# Patient Record
Sex: MALE | Race: WHITE | NOT HISPANIC OR LATINO
[De-identification: names, ages, dates, MRNs, and addresses within clinical notes are randomized per-mention and may not be internally consistent; named-entity substitution may affect disease eponyms.]

---

## 2019-01-15 ENCOUNTER — RESULT REVIEW (OUTPATIENT)
Age: 64
End: 2019-01-15

## 2019-01-15 ENCOUNTER — TRANSCRIPTION ENCOUNTER (OUTPATIENT)
Age: 64
End: 2019-01-15

## 2019-01-15 ENCOUNTER — INPATIENT (INPATIENT)
Facility: HOSPITAL | Age: 64
LOS: 0 days | Discharge: HOME | DRG: 343 | End: 2019-01-16
Attending: SURGERY | Admitting: SURGERY
Payer: COMMERCIAL

## 2019-01-15 VITALS
DIASTOLIC BLOOD PRESSURE: 69 MMHG | HEART RATE: 60 BPM | HEIGHT: 71 IN | OXYGEN SATURATION: 99 % | RESPIRATION RATE: 18 BRPM | WEIGHT: 214.95 LBS | SYSTOLIC BLOOD PRESSURE: 184 MMHG | TEMPERATURE: 98 F

## 2019-01-15 LAB
ALBUMIN SERPL ELPH-MCNC: 4.9 G/DL — SIGNIFICANT CHANGE UP (ref 3.3–5)
ALP SERPL-CCNC: 88 U/L — SIGNIFICANT CHANGE UP (ref 40–120)
ALT FLD-CCNC: 28 U/L — SIGNIFICANT CHANGE UP (ref 10–45)
ANION GAP SERPL CALC-SCNC: 15 MMOL/L — SIGNIFICANT CHANGE UP (ref 5–17)
APPEARANCE UR: CLEAR — SIGNIFICANT CHANGE UP
APTT BLD: 31.8 SEC — SIGNIFICANT CHANGE UP (ref 27.5–36.3)
AST SERPL-CCNC: 21 U/L — SIGNIFICANT CHANGE UP (ref 10–40)
BACTERIA # UR AUTO: NEGATIVE — SIGNIFICANT CHANGE UP
BASOPHILS # BLD AUTO: 0 K/UL — SIGNIFICANT CHANGE UP (ref 0–0.2)
BASOPHILS NFR BLD AUTO: 0.1 % — SIGNIFICANT CHANGE UP (ref 0–2)
BILIRUB SERPL-MCNC: 0.5 MG/DL — SIGNIFICANT CHANGE UP (ref 0.2–1.2)
BILIRUB UR-MCNC: NEGATIVE — SIGNIFICANT CHANGE UP
BUN SERPL-MCNC: 9 MG/DL — SIGNIFICANT CHANGE UP (ref 7–23)
CALCIUM SERPL-MCNC: 9.6 MG/DL — SIGNIFICANT CHANGE UP (ref 8.4–10.5)
CHLORIDE SERPL-SCNC: 96 MMOL/L — SIGNIFICANT CHANGE UP (ref 96–108)
CO2 SERPL-SCNC: 27 MMOL/L — SIGNIFICANT CHANGE UP (ref 22–31)
COLOR SPEC: SIGNIFICANT CHANGE UP
CREAT SERPL-MCNC: 0.95 MG/DL — SIGNIFICANT CHANGE UP (ref 0.5–1.3)
DIFF PNL FLD: NEGATIVE — SIGNIFICANT CHANGE UP
EOSINOPHIL # BLD AUTO: 0.1 K/UL — SIGNIFICANT CHANGE UP (ref 0–0.5)
EOSINOPHIL NFR BLD AUTO: 0.3 % — SIGNIFICANT CHANGE UP (ref 0–6)
EPI CELLS # UR: 0 /HPF — SIGNIFICANT CHANGE UP
GAS PNL BLDV: SIGNIFICANT CHANGE UP
GLUCOSE SERPL-MCNC: 119 MG/DL — HIGH (ref 70–99)
GLUCOSE UR QL: NEGATIVE — SIGNIFICANT CHANGE UP
HCT VFR BLD CALC: 47.4 % — SIGNIFICANT CHANGE UP (ref 39–50)
HGB BLD-MCNC: 16.6 G/DL — SIGNIFICANT CHANGE UP (ref 13–17)
HYALINE CASTS # UR AUTO: 1 /LPF — SIGNIFICANT CHANGE UP (ref 0–2)
INR BLD: 1.04 RATIO — SIGNIFICANT CHANGE UP (ref 0.88–1.16)
KETONES UR-MCNC: NEGATIVE — SIGNIFICANT CHANGE UP
LEUKOCYTE ESTERASE UR-ACNC: NEGATIVE — SIGNIFICANT CHANGE UP
LYMPHOCYTES # BLD AUTO: 1 K/UL — SIGNIFICANT CHANGE UP (ref 1–3.3)
LYMPHOCYTES # BLD AUTO: 5 % — LOW (ref 13–44)
MCHC RBC-ENTMCNC: 32.4 PG — SIGNIFICANT CHANGE UP (ref 27–34)
MCHC RBC-ENTMCNC: 35 GM/DL — SIGNIFICANT CHANGE UP (ref 32–36)
MCV RBC AUTO: 92.6 FL — SIGNIFICANT CHANGE UP (ref 80–100)
MONOCYTES # BLD AUTO: 1 K/UL — HIGH (ref 0–0.9)
MONOCYTES NFR BLD AUTO: 4.9 % — SIGNIFICANT CHANGE UP (ref 2–14)
NEUTROPHILS # BLD AUTO: 17.9 K/UL — HIGH (ref 1.8–7.4)
NEUTROPHILS NFR BLD AUTO: 89.8 % — HIGH (ref 43–77)
NITRITE UR-MCNC: NEGATIVE — SIGNIFICANT CHANGE UP
PH UR: 7.5 — SIGNIFICANT CHANGE UP (ref 5–8)
PLATELET # BLD AUTO: 287 K/UL — SIGNIFICANT CHANGE UP (ref 150–400)
POTASSIUM SERPL-MCNC: 3.7 MMOL/L — SIGNIFICANT CHANGE UP (ref 3.5–5.3)
POTASSIUM SERPL-SCNC: 3.7 MMOL/L — SIGNIFICANT CHANGE UP (ref 3.5–5.3)
PROT SERPL-MCNC: 8.2 G/DL — SIGNIFICANT CHANGE UP (ref 6–8.3)
PROT UR-MCNC: ABNORMAL
PROTHROM AB SERPL-ACNC: 11.9 SEC — SIGNIFICANT CHANGE UP (ref 10–12.9)
RBC # BLD: 5.12 M/UL — SIGNIFICANT CHANGE UP (ref 4.2–5.8)
RBC # FLD: 12.7 % — SIGNIFICANT CHANGE UP (ref 10.3–14.5)
RBC CASTS # UR COMP ASSIST: 3 /HPF — SIGNIFICANT CHANGE UP (ref 0–4)
SODIUM SERPL-SCNC: 138 MMOL/L — SIGNIFICANT CHANGE UP (ref 135–145)
SP GR SPEC: 1.02 — SIGNIFICANT CHANGE UP (ref 1.01–1.02)
UROBILINOGEN FLD QL: NEGATIVE — SIGNIFICANT CHANGE UP
WBC # BLD: 19.9 K/UL — HIGH (ref 3.8–10.5)
WBC # FLD AUTO: 19.9 K/UL — HIGH (ref 3.8–10.5)
WBC UR QL: 0 /HPF — SIGNIFICANT CHANGE UP (ref 0–5)

## 2019-01-15 PROCEDURE — 99285 EMERGENCY DEPT VISIT HI MDM: CPT

## 2019-01-15 PROCEDURE — 74177 CT ABD & PELVIS W/CONTRAST: CPT | Mod: 26

## 2019-01-15 RX ORDER — ONDANSETRON 8 MG/1
4 TABLET, FILM COATED ORAL ONCE
Qty: 0 | Refills: 0 | Status: COMPLETED | OUTPATIENT
Start: 2019-01-15 | End: 2019-01-15

## 2019-01-15 RX ORDER — MORPHINE SULFATE 50 MG/1
4 CAPSULE, EXTENDED RELEASE ORAL ONCE
Qty: 0 | Refills: 0 | Status: DISCONTINUED | OUTPATIENT
Start: 2019-01-15 | End: 2019-01-15

## 2019-01-15 RX ORDER — SODIUM CHLORIDE 9 MG/ML
1000 INJECTION INTRAMUSCULAR; INTRAVENOUS; SUBCUTANEOUS ONCE
Qty: 0 | Refills: 0 | Status: COMPLETED | OUTPATIENT
Start: 2019-01-15 | End: 2019-01-15

## 2019-01-15 RX ORDER — SODIUM CHLORIDE 9 MG/ML
1000 INJECTION, SOLUTION INTRAVENOUS ONCE
Qty: 0 | Refills: 0 | Status: COMPLETED | OUTPATIENT
Start: 2019-01-15 | End: 2019-01-15

## 2019-01-15 RX ORDER — PIPERACILLIN AND TAZOBACTAM 4; .5 G/20ML; G/20ML
3.38 INJECTION, POWDER, LYOPHILIZED, FOR SOLUTION INTRAVENOUS ONCE
Qty: 0 | Refills: 0 | Status: COMPLETED | OUTPATIENT
Start: 2019-01-15 | End: 2019-01-16

## 2019-01-15 RX ADMIN — SODIUM CHLORIDE 2000 MILLILITER(S): 9 INJECTION INTRAMUSCULAR; INTRAVENOUS; SUBCUTANEOUS at 21:59

## 2019-01-15 RX ADMIN — MORPHINE SULFATE 4 MILLIGRAM(S): 50 CAPSULE, EXTENDED RELEASE ORAL at 22:15

## 2019-01-15 RX ADMIN — MORPHINE SULFATE 4 MILLIGRAM(S): 50 CAPSULE, EXTENDED RELEASE ORAL at 21:59

## 2019-01-15 RX ADMIN — ONDANSETRON 4 MILLIGRAM(S): 8 TABLET, FILM COATED ORAL at 21:59

## 2019-01-15 NOTE — ED PROVIDER NOTE - OBJECTIVE STATEMENT
63M with past medical history bph presents with 1d h/o generalized abdominal pain now localized to RLQ with no radiation, a/w nbnb n/v.  Last bowel movement was this AM and was normal.  Denies fever, diarrhea, dsyuria, testicle pain.

## 2019-01-15 NOTE — ED PROVIDER NOTE - PHYSICAL EXAMINATION
***GEN - well appearing; NAD   ***HEAD - NC/AT  ***EYES/NOSE - PEERL, EOMI, mucous membranes moist, no discharge   ***THROAT: Oral cavity and pharynx normal. No inflammation, swelling, exudate, or lesions.    ***NECK: supple, non-tender no lymphadenopathy  ***PULMONARY - CTA b/l, symmetric breath sounds.   ***CARDIAC- s1s2, RRR, no murmur  ***ABDOMEN - +BS, ND, right lower quadrant ttp, soft, no guarding, no rebound, no organomegaly  ***BACK - no CVA tenderness, Normal  spine, no spinal TTP  ***EXTREMITIES - symmetric pulses, 2+ dp, capillary refill < 2 seconds, no clubbing, no cyanosis, no edema   ***SKIN - warm, dry, intact, no rash or bruising   ***NEUROLOGIC - a&o x3, CN 3-12 intact, sensation nl, motor 5/5 RUE/LUE/RLE/LLE gait nl, ***GEN - well appearing; NAD   ***HEAD - NC/AT  ***EYES/NOSE - PEERL, EOMI, mucous membranes moist, no discharge   ***THROAT: Oral cavity and pharynx normal. No inflammation, swelling, exudate, or lesions.    ***NECK: supple, non-tender no lymphadenopathy  ***PULMONARY - CTA b/l, symmetric breath sounds.   ***CARDIAC- s1s2, RRR, no murmur  ***ABDOMEN - +BS, ND, right lower quadrant ttp, soft, no guarding, no rebound, no organomegaly  ***BACK - no CVA tenderness, Normal  spine, no spinal TTP  ***EXTREMITIES - symmetric pulses, 2+ dp, capillary refill < 2 seconds, no clubbing, no cyanosis, no edema   ***SKIN - warm, dry, intact, no rash or bruising   ***NEUROLOGIC - a&o x3, CN 3-12 intact, sensation nl, motor 5/5 RUE/LUE/RLE/LLE gait nl,        Dr Wenceslao davis --

## 2019-01-15 NOTE — ED PROVIDER NOTE - MEDICAL DECISION MAKING DETAILS
63M with RLQ pain, initially diffuse now localized.  COncerning for appendicitis vs. divertic vs. hernia.  Labs, ct, pain meds.  hd stable.

## 2019-01-15 NOTE — ED ADULT NURSE NOTE - NSIMPLEMENTINTERV_GEN_ALL_ED
Implemented All Universal Safety Interventions:  [place] to call system. Call bell, personal items and telephone within reach. Instruct patient to call for assistance. Room bathroom lighting operational. Non-slip footwear when patient is off stretcher. Physically safe environment: no spills, clutter or unnecessary equipment. Stretcher in lowest position, wheels locked, appropriate side rails in place.

## 2019-01-15 NOTE — ED ADULT NURSE NOTE - OBJECTIVE STATEMENT
63 YOM A&Ox3 came in for stabbing right lower quadrant abdominal pain that began earlier today rated 6/10 with nausea. Patient had RLQ tenderness with no radiationand dull pain in mid abdominal area. 63 YOM A&Ox3 came in for stabbing right lower quadrant abdominal pain that began earlier today rated 6/10 with nausea. Patient had RLQ tenderness with no radiation and dull pain in mid abdominal area. Patient stated vomited 1 time today. Lung sounds clear & unlabored, heart sound within normal limits, bowel sounds heard in all 4 quadrants. Patient denies issues with bowel & bladder such as hematuria, pain and burning. Patient denies SOB, fever/chills, chest pain, headaches, blurry vision and diarrhea at this time. Patient denies pmh & taking daily medications. Safety & comfort maintained.

## 2019-01-15 NOTE — ED ADULT NURSE NOTE - CHPI ED NUR SYMPTOMS NEG
no blood in stool/no chills/no fever/no hematuria/no burning urination/no abdominal distension/no vomiting/no diarrhea

## 2019-01-16 ENCOUNTER — TRANSCRIPTION ENCOUNTER (OUTPATIENT)
Age: 64
End: 2019-01-16

## 2019-01-16 VITALS
TEMPERATURE: 98 F | HEART RATE: 63 BPM | SYSTOLIC BLOOD PRESSURE: 144 MMHG | DIASTOLIC BLOOD PRESSURE: 81 MMHG | OXYGEN SATURATION: 97 % | RESPIRATION RATE: 16 BRPM

## 2019-01-16 DIAGNOSIS — K35.80 UNSPECIFIED ACUTE APPENDICITIS: ICD-10-CM

## 2019-01-16 DIAGNOSIS — Z90.79 ACQUIRED ABSENCE OF OTHER GENITAL ORGAN(S): Chronic | ICD-10-CM

## 2019-01-16 LAB
BLD GP AB SCN SERPL QL: NEGATIVE — SIGNIFICANT CHANGE UP
GAS PNL BLDV: SIGNIFICANT CHANGE UP
RH IG SCN BLD-IMP: NEGATIVE — SIGNIFICANT CHANGE UP

## 2019-01-16 PROCEDURE — 85610 PROTHROMBIN TIME: CPT

## 2019-01-16 PROCEDURE — 93005 ELECTROCARDIOGRAM TRACING: CPT

## 2019-01-16 PROCEDURE — 82947 ASSAY GLUCOSE BLOOD QUANT: CPT

## 2019-01-16 PROCEDURE — 86850 RBC ANTIBODY SCREEN: CPT

## 2019-01-16 PROCEDURE — 85014 HEMATOCRIT: CPT

## 2019-01-16 PROCEDURE — 85027 COMPLETE CBC AUTOMATED: CPT

## 2019-01-16 PROCEDURE — 96375 TX/PRO/DX INJ NEW DRUG ADDON: CPT

## 2019-01-16 PROCEDURE — 80053 COMPREHEN METABOLIC PANEL: CPT

## 2019-01-16 PROCEDURE — 86901 BLOOD TYPING SEROLOGIC RH(D): CPT

## 2019-01-16 PROCEDURE — 83605 ASSAY OF LACTIC ACID: CPT

## 2019-01-16 PROCEDURE — 82803 BLOOD GASES ANY COMBINATION: CPT

## 2019-01-16 PROCEDURE — 44970 LAPAROSCOPY APPENDECTOMY: CPT

## 2019-01-16 PROCEDURE — 84132 ASSAY OF SERUM POTASSIUM: CPT

## 2019-01-16 PROCEDURE — 82330 ASSAY OF CALCIUM: CPT

## 2019-01-16 PROCEDURE — 74177 CT ABD & PELVIS W/CONTRAST: CPT

## 2019-01-16 PROCEDURE — 99221 1ST HOSP IP/OBS SF/LOW 40: CPT | Mod: 57

## 2019-01-16 PROCEDURE — 86900 BLOOD TYPING SEROLOGIC ABO: CPT

## 2019-01-16 PROCEDURE — 85730 THROMBOPLASTIN TIME PARTIAL: CPT

## 2019-01-16 PROCEDURE — 82435 ASSAY OF BLOOD CHLORIDE: CPT

## 2019-01-16 PROCEDURE — 84295 ASSAY OF SERUM SODIUM: CPT

## 2019-01-16 PROCEDURE — 81001 URINALYSIS AUTO W/SCOPE: CPT

## 2019-01-16 PROCEDURE — 96374 THER/PROPH/DIAG INJ IV PUSH: CPT

## 2019-01-16 PROCEDURE — 99285 EMERGENCY DEPT VISIT HI MDM: CPT | Mod: 25

## 2019-01-16 RX ORDER — ONDANSETRON 8 MG/1
4 TABLET, FILM COATED ORAL ONCE
Qty: 0 | Refills: 0 | Status: COMPLETED | OUTPATIENT
Start: 2019-01-16 | End: 2019-01-16

## 2019-01-16 RX ORDER — FENTANYL CITRATE 50 UG/ML
50 INJECTION INTRAVENOUS ONCE
Qty: 0 | Refills: 0 | Status: DISCONTINUED | OUTPATIENT
Start: 2019-01-16 | End: 2019-01-16

## 2019-01-16 RX ORDER — OXYCODONE HYDROCHLORIDE 5 MG/1
5 TABLET ORAL EVERY 4 HOURS
Qty: 0 | Refills: 0 | Status: DISCONTINUED | OUTPATIENT
Start: 2019-01-16 | End: 2019-01-16

## 2019-01-16 RX ORDER — OXYCODONE HYDROCHLORIDE 5 MG/1
1 TABLET ORAL
Qty: 12 | Refills: 0 | OUTPATIENT
Start: 2019-01-16 | End: 2019-01-18

## 2019-01-16 RX ORDER — ENOXAPARIN SODIUM 100 MG/ML
40 INJECTION SUBCUTANEOUS DAILY
Qty: 0 | Refills: 0 | Status: DISCONTINUED | OUTPATIENT
Start: 2019-01-16 | End: 2019-01-16

## 2019-01-16 RX ORDER — FINASTERIDE 5 MG/1
5 TABLET, FILM COATED ORAL DAILY
Qty: 0 | Refills: 0 | Status: DISCONTINUED | OUTPATIENT
Start: 2019-01-16 | End: 2019-01-16

## 2019-01-16 RX ORDER — OXYCODONE HYDROCHLORIDE 5 MG/1
10 TABLET ORAL EVERY 4 HOURS
Qty: 0 | Refills: 0 | Status: DISCONTINUED | OUTPATIENT
Start: 2019-01-16 | End: 2019-01-16

## 2019-01-16 RX ORDER — MORPHINE SULFATE 50 MG/1
4 CAPSULE, EXTENDED RELEASE ORAL ONCE
Qty: 0 | Refills: 0 | Status: DISCONTINUED | OUTPATIENT
Start: 2019-01-16 | End: 2019-01-16

## 2019-01-16 RX ORDER — ACETAMINOPHEN 500 MG
650 TABLET ORAL EVERY 6 HOURS
Qty: 0 | Refills: 0 | Status: DISCONTINUED | OUTPATIENT
Start: 2019-01-16 | End: 2019-01-16

## 2019-01-16 RX ORDER — SODIUM CHLORIDE 9 MG/ML
1000 INJECTION, SOLUTION INTRAVENOUS
Qty: 0 | Refills: 0 | Status: DISCONTINUED | OUTPATIENT
Start: 2019-01-16 | End: 2019-01-16

## 2019-01-16 RX ORDER — FENTANYL CITRATE 50 UG/ML
25 INJECTION INTRAVENOUS
Qty: 0 | Refills: 0 | Status: DISCONTINUED | OUTPATIENT
Start: 2019-01-16 | End: 2019-01-16

## 2019-01-16 RX ORDER — ONDANSETRON 8 MG/1
4 TABLET, FILM COATED ORAL ONCE
Qty: 0 | Refills: 0 | Status: DISCONTINUED | OUTPATIENT
Start: 2019-01-16 | End: 2019-01-16

## 2019-01-16 RX ADMIN — SODIUM CHLORIDE 2000 MILLILITER(S): 9 INJECTION, SOLUTION INTRAVENOUS at 00:15

## 2019-01-16 RX ADMIN — PIPERACILLIN AND TAZOBACTAM 200 GRAM(S): 4; .5 INJECTION, POWDER, LYOPHILIZED, FOR SOLUTION INTRAVENOUS at 00:12

## 2019-01-16 RX ADMIN — ONDANSETRON 4 MILLIGRAM(S): 8 TABLET, FILM COATED ORAL at 01:05

## 2019-01-16 RX ADMIN — SODIUM CHLORIDE 100 MILLILITER(S): 9 INJECTION, SOLUTION INTRAVENOUS at 04:00

## 2019-01-16 RX ADMIN — MORPHINE SULFATE 4 MILLIGRAM(S): 50 CAPSULE, EXTENDED RELEASE ORAL at 01:05

## 2019-01-16 NOTE — H&P ADULT - ASSESSMENT
This is a 64 y/o male with a medical history as above with acute appendicitis     -Admit to Dr. Draper  -NPO  -IV ABX   -IVF  -Continue home meds  -For OR today for laparoscopic appendectomy, possible open  -D/w Dr. Draper

## 2019-01-16 NOTE — CHART NOTE - NSCHARTNOTEFT_GEN_A_CORE
Post-operative Check    SUBJECTIVE: No acute events in the immediate post-operative period. Pain well controlled. Tolerating diet, no n/v/f/c. Has voided. Reports he is doing well.     OBJECTIVE:  T(C): 36.7 (01-16-19 @ 07:40), Max: 37.2 (01-15-19 @ 21:19)  HR: 82 (01-16-19 @ 08:00) (60 - 82)  BP: 129/75 (01-16-19 @ 08:00) (120/66 - 184/69)  RR: 16 (01-16-19 @ 08:00) (12 - 18)  SpO2: 95% (01-16-19 @ 08:00) (94% - 99%)      01-15-19 @ 07:01  -  01-16-19 @ 07:00  --------------------------------------------------------  IN: 500 mL / OUT: 1325 mL / NET: -825 mL    01-16-19 @ 07:01  -  01-16-19 @ 08:26  --------------------------------------------------------  IN: 200 mL / OUT: 0 mL / NET: 200 mL        Physical Exam:     NAD, awake and alert  Respirations nonlabored  Abdomen soft, nontender, nondistended, incision sites c/d/i  No guarding or rebound tenderness      ASSESSMENT:   ASAF DAMON is a 63y Male POD#0 from lap appy. Stable, ready to go home.     PLAN:  - Pain management  - d/c

## 2019-01-16 NOTE — DISCHARGE NOTE ADULT - ADDITIONAL INSTRUCTIONS
WOUND CARE: Steri-strips have been applied to your incisions.  Do not remove these.  They will fall off as they get wet; in approximately 7-10 days.  BATHING: Please do not submerge wound underwater. You may shower and/or sponge bathe.  ACTIVITY: No heavy lifting or straining. Otherwise, you may return to your usual level of physical activity. If you are taking narcotic pain medication (such as Percocet), do NOT drive a car, operate machinery or make important decisions.  DIET: Return to your usual diet.  NOTIFY YOUR SURGEON IF: You have any bleeding that does not stop, any pus draining from your wound, any fever (over 100.4 F) or chills, persistent nausea/vomiting, persistent diarrhea, or if your pain is not controlled on your discharge pain medications.  FOLLOW-UP:  1. Please call to make a follow-up appointment within one week of discharge with Dr. Draper.   2. Please follow up with your primary care physician in one week regarding your hospitalization.  3. There was several incidental findings noted on your CT scan that you should discuss with your primary physician. They should be monitored and followed up appropriately. The following include: LIVER: Left hepatic cyst. VESSELS:  Mild atherosclerotic calcifications of the aorta. ABDOMINAL WALL: Multiple fat-containing ventral hernias. It is important to discuss these with your primary physician and follow accordingly.           Please notify your Attending Physician if after discharge you have fever, chills, abdominal pain, purulent drainage from your incision, in ability to pass flatus, not tolerating PO diet, abdominal bloating/distention, [place] and or vomiting. Additionally any acute changes as well. Please report back to the Emergency Department if unable to reach Physician.

## 2019-01-16 NOTE — DISCHARGE NOTE ADULT - HOSPITAL COURSE
This is a 62 y/o male with a medical history of BPH s/p Laparoscopic Prostate resection who presents to the ED today with c/o RLQ abdominal pain that initially started as periumbilical pain.  Pt states that he felt generalized abdominal discomfort that began at approx. 10am for which he attributed to mild discomfort.  Pain began to increase in severity throughout the day and was associated with nausea and vomiting during lunch.  He states that the pain eventually localized to the RLQ with pain that was persistent in nature. Pt then presented to the ED for further work-up.  In the ED he had labs drawn and a CT abd and pelvis which revealed a leukocytosis and acute appendicitis, arespectively.  General surgery consulted for further management.  Pt denies fevers, chills, recent sick contacts.  (Above as per admitting resident.)     CT scan: IMPRESSION: Dilated appendix with appendicoliths. No significant inflammatory change. Findings could be due to early appendicitis. Please correlate clinically. Brief OP note: inflamed gangrenous appendix, not perforated. No free fluid. Non complicated procedure patient was taken to the PACU and monitored appropriately before discharge.     During the hospital course, patient had lab work performed on a daily basis. Prior to discharge lab work was noted to be within normal. A normal WBC was noted to be down trending, and a HCT was noted within normal limits. Vitals were checked on a four hour basis, and patient was noted to be normotensive and afebrile upon discharge. Patient had a normal heart rate and adequate oxygen saturations. SOB/BARAJAS was denied. Pain medication was given prior to discharge with no allergic reaction, and reported adequate pain control.    At the time of discharge, the patient was hemodynamically stable, was tolerating PO diet, was voiding urine and passing stool, was ambulating, and was comfortable with adequate pain control. The patient was instructed to follow up with Dr. Draper within 1 week after discharge from the hospital. The patient felt comfortable with discharge. The patient was discharged to home. The patient had no other issues.

## 2019-01-16 NOTE — DISCHARGE NOTE ADULT - CARE PROVIDER_API CALL
Mehul Draper), Surgery  [address] [place] [address]  Phone: [phone]  Fax: [phone]

## 2019-01-16 NOTE — BRIEF OPERATIVE NOTE - PROCEDURE
<<-----Click on this checkbox to enter Procedure Laparoscopic appendectomy  01/16/2019    Active  [id number]

## 2019-01-16 NOTE — DISCHARGE NOTE ADULT - PATIENT PORTAL LINK FT
You can access the [facility] Portal, offered by [facility], by registering with the following website: [email or web address]

## 2019-01-16 NOTE — DISCHARGE NOTE ADULT - CARE PLAN
Principal Discharge DX:	Acute appendicitis, unspecified acute appendicitis type  Goal:	Antibiotics, s/p Laparoscopic appendectomy  Assessment and plan of treatment:	-Pain control   -OOB as tolerated   -Follow up within one week with Dr. Draper, and Primary care physician.

## 2019-01-16 NOTE — H&P ADULT - NSHPSOCIALHISTORY_GEN_ALL_CORE
ETOH: Denies  TOB: Denies  Illicits: Denies  Work: Works as , lives with family; Good social support network

## 2019-01-16 NOTE — H&P ADULT - ATTENDING COMMENTS
Pt seen and examined. Agree with A/P. Taken to OR for lap appy, tolerated procedure well. Will advance diet, pain control, home in am.

## 2019-01-16 NOTE — H&P ADULT - NSHPLABSRESULTS_GEN_ALL_CORE
01-15    138  |  96  |  9   ----------------------------<  119<H>  3.7   |  27  |  0.95    Ca    9.6      15 Tadeo 2019 22:17    TPro  8.2  /  Alb  4.9  /  TBili  0.5  /  DBili  x   /  AST  21  /  ALT  28  /  AlkPhos  88  01-15                          16.6   19.9  )-----------( 287      ( 15 Tadeo 2019 22:17 )             47.4       CT Abdomen and Pelvis w/ IV Cont (01.15.19 @ 23:20) >    FINDINGS:    LOWER CHEST: Bibasilar subsegmental atelectasis.    LIVER: Left hepatic cyst.  BILE DUCTS: Normal caliber.  GALLBLADDER: Within normal limits.  SPLEEN: Within normal limits.  PANCREAS: Within normal limits.  ADRENALS: Within normal limits.  KIDNEYS/URETERS: Within normal limits.    BLADDER: Within normal limits.  REPRODUCTIVE ORGANS: The prostate is within normal limits.    BOWEL: No bowel obstruction. The base of the appendix is dilated up to   1.5 cm and contains a couple of appendicolith, one at its orifice. There   is no striking periappendiceal inflammatory change. No adjacent fluid   collection or free air.  PERITONEUM: No ascites.  VESSELS:  Mild atherosclerotic calcifications of the aorta.  RETROPERITONEUM: No lymphadenopathy.    ABDOMINAL WALL: Multiple fat-containing ventral hernias.  BONES: Mild degenerative changes of the spine.    IMPRESSION:     Dilated appendix with appendicoliths. No significant inflammatory change.   Findings could be due to early appendicitis. Please correlate clinically.

## 2019-01-16 NOTE — BRIEF OPERATIVE NOTE - OPERATION/FINDINGS
inflamed gangrenous appendix, not perforated. No free fluid.     mesoappendix taken with ligasure, base taken with blue 30 staple load. hemostatic

## 2019-01-16 NOTE — DISCHARGE NOTE ADULT - PLAN OF CARE
Antibiotics, s/p Laparoscopic appendectomy -Pain control   -OOB as tolerated   -Follow up within one week with Dr. Draper, and Primary care physician.

## 2019-01-16 NOTE — H&P ADULT - HISTORY OF PRESENT ILLNESS
This is a 62 y/o male with a medical history of BPH s/p Laparoscopic Prostate resection who presents to the ED today with c/o RLQ abdominal pain that initially started as periumbilical pain.  Pt states that he felt generalizied abdominal discomfort that began at approx 10am for which he attributed to mild discomfort.  Pain began to increase in severity throughout the day and was associated with nausea and vomiting during lunch.  He states that the pain eventually localized to the RLQ with pain that was persistent in nature. Pt then presented to the ED for further work-up.  In the ED he had labs drawna nd a CT abd and pelvis which revealed a leukocytosis and acute appendicitis, arespectively.  General surgery consulted for further management.  Pt denies fevers, chills, recent sick contacts.

## 2019-01-16 NOTE — H&P ADULT - NSHPPHYSICALEXAM_GEN_ALL_CORE
General: NAD, Pleasant  Neurology: Patient is AA&Ox4, follows commands, and speech fluent. EOMI intact, PERRLA  Neck: Neck supple, trachea midline, No JVD  Respiratory: CTA B/L, (-)rales, rhonchi  CV: S1S2, r/r/r, (-)m/r/g  Abdominal: S/ND, BSx4, TTP to RLQ  Extremities: 2+ peripheral pulses bilat throughout; (-)edema appreciated  Skin: No Rashes, Hematoma, Ecchymosis

## 2019-01-16 NOTE — DISCHARGE NOTE ADULT - MEDICATION SUMMARY - MEDICATIONS TO TAKE
I will START or STAY ON the medications listed below when I get home from the hospital:    Avodart 0.5 mg oral capsule  -- 1 cap(s) by mouth once a day  -- Indication: For Home med    oxyCODONE 5 mg oral capsule  -- 1 cap(s) by mouth every 6 hours, As Needed -for severe pain MDD:4 tablets   -- Caution federal law prohibits the transfer of this drug to any person other  than the person for whom it was prescribed.  It is very important that you take or use this exactly as directed.  Do not skip doses or discontinue unless directed by your doctor.  May cause drowsiness.  Alcohol may intensify this effect.  Use care when operating dangerous machinery.  This prescription cannot be refilled.  Using more of this medication than prescribed may cause serious breathing problems.    -- Indication: For severe pain after surgery    terazosin  -- 17 milligram(s) by mouth once a day  -- Indication: For Home med

## 2019-01-16 NOTE — DISCHARGE NOTE ADULT - NS AS ACTIVITY OBS
Do not drive or operate machinery/No Heavy lifting/straining/Do not make important decisions/Showering allowed/Not while taking pain medication

## 2019-01-18 LAB — SURGICAL PATHOLOGY STUDY: SIGNIFICANT CHANGE UP

## 2020-10-05 NOTE — ED ADULT NURSE NOTE - PMH
Additional Notes: Patient consent was obtained to proceed with the visit and recommended plan of care after discussion of all risks and benefits, including the risks of COVID-19 exposure. Detail Level: Simple BPH (benign prostatic hyperplasia)    Prostatitis  on and off Cipro for the past year  Reflux

## 2021-02-26 NOTE — ED ADULT NURSE NOTE - NS ED NURSE REPORT GIVEN TO FT
Medical Necessity Statement: Based on my medical judgement, Mohs surgery is the most appropriate treatment for this cancer compared to other treatments. DAVIDA Sarmiento

## 2022-11-24 ENCOUNTER — HOSPITAL ENCOUNTER (OUTPATIENT)
Dept: HOSPITAL 74 - FER | Age: 67
Setting detail: OBSERVATION
LOS: 2 days | Discharge: HOME | DRG: 309 | End: 2022-11-26
Attending: INTERNAL MEDICINE | Admitting: INTERNAL MEDICINE
Payer: COMMERCIAL

## 2022-11-24 VITALS — BODY MASS INDEX: 28.5 KG/M2

## 2022-11-24 DIAGNOSIS — E78.5: ICD-10-CM

## 2022-11-24 DIAGNOSIS — I47.1: Primary | ICD-10-CM

## 2022-11-24 DIAGNOSIS — I10: ICD-10-CM

## 2022-11-24 DIAGNOSIS — I24.8: ICD-10-CM

## 2022-11-24 LAB
ALBUMIN SERPL-MCNC: 4.3 G/DL (ref 3.4–5)
ALP SERPL-CCNC: 69 U/L (ref 45–117)
ALT SERPL-CCNC: 26 U/L (ref 13–61)
ANION GAP SERPL CALC-SCNC: 5 MMOL/L (ref 8–16)
AST SERPL-CCNC: 27 U/L (ref 15–37)
BILIRUB SERPL-MCNC: 0.7 MG/DL (ref 0.2–1)
BUN SERPL-MCNC: 18 MG/DL (ref 7–18)
CALCIUM SERPL-MCNC: 8.8 MG/DL (ref 8.5–10)
CHLORIDE SERPL-SCNC: 109 MMOL/L (ref 98–107)
CO2 SERPL-SCNC: 26 MMOL/L (ref 21–32)
CREAT SERPL-MCNC: 1.2 MG/DL (ref 0.55–1.3)
DEPRECATED RDW RBC AUTO: 14 % (ref 11.9–15.9)
GLUCOSE SERPL-MCNC: 114 MG/DL (ref 74–106)
HCT VFR BLD CALC: 46.5 % (ref 35.4–49)
HGB BLD-MCNC: 16.5 G/DL (ref 11.7–16.9)
INR BLD: 1.1 (ref 0.83–1.09)
MCH RBC QN AUTO: 32.9 PG (ref 25.7–33.7)
MCHC RBC AUTO-ENTMCNC: 35.4 G/DL (ref 32–35.9)
MCV RBC: 92.8 FL (ref 80–96)
PLATELET # BLD AUTO: 298.6 10^3/UL (ref 134–434)
PLATELET BLD QL SMEAR: ADEQUATE
PMV BLD: 7.1 FL (ref 7.5–11.1)
PROT SERPL-MCNC: 6.9 G/DL (ref 6.4–8.2)
PT PNL PPP: 12.7 SEC (ref 9.7–13)
RBC # BLD AUTO: 5.01 10^6/UL (ref 4–5.6)
SODIUM SERPL-SCNC: 140 MMOL/L (ref 136–145)
WBC # BLD AUTO: 14.3 10^3/UL (ref 4–10.8)

## 2022-11-24 PROCEDURE — G0378 HOSPITAL OBSERVATION PER HR: HCPCS

## 2022-11-25 PROCEDURE — 5A2204Z RESTORATION OF CARDIAC RHYTHM, SINGLE: ICD-10-PCS | Performed by: INTERNAL MEDICINE

## 2022-11-25 RX ADMIN — ASPIRIN 81 MG SCH MG: 81 TABLET ORAL at 10:51

## 2022-11-26 VITALS
RESPIRATION RATE: 20 BRPM | HEART RATE: 54 BPM | DIASTOLIC BLOOD PRESSURE: 73 MMHG | TEMPERATURE: 98.1 F | SYSTOLIC BLOOD PRESSURE: 145 MMHG

## 2022-11-26 LAB
ANION GAP SERPL CALC-SCNC: 6 MMOL/L (ref 8–16)
BUN SERPL-MCNC: 16.6 MG/DL (ref 7–18)
CALCIUM SERPL-MCNC: 7.9 MG/DL (ref 8.5–10.1)
CHLORIDE SERPL-SCNC: 108 MMOL/L (ref 98–107)
CO2 SERPL-SCNC: 27 MMOL/L (ref 21–32)
CREAT SERPL-MCNC: 1 MG/DL (ref 0.55–1.3)
GLUCOSE SERPL-MCNC: 97 MG/DL (ref 74–106)
SODIUM SERPL-SCNC: 141 MMOL/L (ref 136–145)

## 2022-11-26 RX ADMIN — ASPIRIN 81 MG SCH MG: 81 TABLET ORAL at 10:00

## 2023-02-13 NOTE — ED ADULT NURSE NOTE - NSSISCREENINGQ1_ED_A_ED
Nephrology Service Consultation      [address], [address] [address]  Phone: [phone]  Office Hours: 8:30AM - 4:30PM  Monday - Friday        MEDICAL DECISION MAKING and Recommendations     -KAVON: cr 2.8 on admission//ddx: volume depletion vs obstruction vs ATN  -CKD4  -Lactatemia  -Syncope at home  -Hx HTN  -BPH:    Suggest:  -Continue IVF  -Ok to stop flomax which can cause orthostasis but finasteride can be continued  -Please avoid nephrotoxins  -May have to reconsider the daily lasix, before dc  -Will follow    Thank you    Patient Active Problem List    Diagnosis Date Noted    Dizziness 02/12/2023    Syncope and collapse 02/12/2023    Community acquired pneumonia of left lung, unspecified part of lung 10/08/2022    Cardiac arrest ([facility] [address] 08/25/2022    Chest pain 08/21/2022    Agitation due to dementia 08/18/2022    Varicose veins of both lower extremities with pain 08/15/2015    Skin ulcer, limited to breakdown of skin ([address] 02/21/2022    Current moderate episode of major depressive disorder, unspecified whether recurrent ([address] 02/21/2022    Chronic kidney disease, stage IV (severe) ([address] 02/21/2022    Recurrent acute deep vein thrombosis (DVT) of right lower extremity ([address] 02/21/2022    Leukocytosis 09/08/2021    Thrombocytopenia, unspecified 08/31/2021    Bandemia 08/10/2021    Atelectasis 08/05/2021    Bullous pemphigoid 06/23/2021    Hyperglycemia 05/25/2021    Hematuria 03/22/2021    UGIB (upper gastrointestinal bleed) 03/02/2021    Acquired hypothyroidism 12/30/2020    Anxiety     BPH with urinary obstruction     Seborrheic keratosis, inflamed 03/10/2014    Actinic keratosis 03/10/2014    Seborrheic keratosis 03/10/2014    SCC (squamous cell carcinoma) 03/10/2014    Hyperlipidemia 09/21/2012    Depression 09/21/2012    Primary insomnia 09/21/2012    Dysuria 09/21/2012    Vitamin D deficiency 09/21/2012         Patient:  Edith Wiley  MRN: [id number]  Consulting physician: Suzanne Plascencia MD  Reason for Consult: office pt  PCP: No primary care provider on file. HISTORY OF PRESENT ILLNESS:   The patient is a 81 y.o. male with CKD4, BPH presented due to feeling lightheaded at home  Renal consult as he is an office pt. Cr was 2.8 on admission which is above baseline for him  Home meds include tamsulosin and lasix. BP was 85/48 on admission  He is on room air currently, alert and awak e and in good spirits    REVIEW OF SYSTEMS:  14 point ROS is Negative. See positive ROS per HPI    Past Medical History:        Diagnosis Date    Anemia     Anxiety     Arthritis     BPH with urinary obstruction     Depression     GERD (gastroesophageal reflux disease)     Hemorrhoids     Hepatitis     Hernia of unspecified site of abdominal cavity without mention of obstruction or gangrene     Hyperlipidemia     Hypotension     SCC (squamous cell carcinoma) 03/10/2014    Rt Tricep, Lt Superior Forearm       Past Surgical History:        Procedure Laterality Date    CATARACT REMOVAL      CHOLECYSTECTOMY, LAPAROSCOPIC N/A 11/15/2022    CHOLECYSTECTOMY LAPAROSCOPIC performed by Shona Sanchez MD at [place] [address] N/A 3/29/2021    CYSTOSCOPY EVACUATION OF CLOTS, BLADDER FULGERATION, AND SUPRA-PUBIC CATHETER INSERTION performed by Jie Rehman MD at [place] [address] N/A 4/1/2021    CYSTOSCOPY, PROSTATE FULGURATION, EVACUATION OF CLOTS & TURP performed by Elizabeth Gracia MD at [address] [place] [address] N/A 3/4/2021    LAPAROSCOPIC [facility] WITH GASTRIC OBSTRUCTION POSS OPEN performed by Shona Sanchez MD at [address] [place] [address]         Medications:   Prior to Admission medications    Medication Sig Start Date End Date Taking?  Authorizing Provider   amLODIPine (NORVASC) 5 MG tablet Take 2 tablets by mouth daily 2/3/23   Adriane Houston DO   metoprolol tartrate (LOPRESSOR) 25 MG tablet Take 1 tablet by mouth 2 times daily 2/3/23 Adriane Houston,    tamsulosin (FLOMAX) 0.4 MG capsule Take 1 capsule by mouth nightly 2/3/23   Adriane Houston DO   finasteride (PROSCAR) 5 MG tablet TAKE 1 TABLET BY MOUTH EVERY DAY 11/7/22   Óscar Dick DO   QUEtiapine (SEROQUEL) 50 MG tablet Take 1 tablet by mouth nightly 10/14/22   Waneta Bernheim, MD   sennosides-docusate sodium (SENOKOT-S) 8.6-50 MG tablet Take 2 tablets by mouth 2 times daily as needed for Constipation 10/14/22   Waneta Bernheim, MD   benzonatate (TESSALON) 200 MG capsule Take 1 capsule by mouth 3 times daily as needed for Cough 10/6/22   Sergio Guerra PA-C   atorvastatin (LIPITOR) 40 MG tablet Take 1 tablet by mouth nightly 8/31/22   Mackenzie Mcguire MD   aspirin 81 MG chewable tablet Take 1 tablet by mouth daily 9/1/22   Mackenzie Mcguire MD   ELIQUIS 2.5 MG TABS tablet TAKE 1 TABLET BY MOUTH TWICE A DAY 8/30/22 8/31/22  Óscar Dick, DO   furosemide (LASIX) 20 MG tablet Take 1 tablet by mouth daily 8/19/22 8/31/22  Historical Provider, MD   pantoprazole (PROTONIX) 20 MG tablet TAKE 1 TABLET BY MOUTH EVERY DAY 7/5/22   Óscar Dick DO   levothyroxine (SYNTHROID) 75 MCG tablet TAKE 1 TABLET BY MOUTH EVERY DAY 5/2/22   Óscar Dick DO   sertraline (ZOLOFT) 50 MG tablet Take 50 mg by mouth daily    Historical Provider, MD   temazepam (RESTORIL) 7.5 MG capsule Take 7.5 mg by mouth at bedtime. Historical Provider, MD        Allergies:  Minocycline    Social History:   TOBACCO:   reports that he quit smoking about 67 years ago. His smoking use included cigarettes. He started smoking about 72 years ago. He has a 5.00 pack-year smoking history. He has never used smokeless tobacco.  ETOH:   reports that he does not currently use alcohol.   OCCUPATION:      Family History:       Problem Relation Age of Onset    Depression Mother     Diabetes Mother     COPD Father     Diabetes Brother     Diabetes Maternal Grandmother      Physical Exam:    Vitals: BP (!) 151/82   Pulse 86 Temp 97.8 °F (36.6 °C) (Oral)   Resp 14   Ht 6' (1.829 m)   Wt 220 lb (99.8 kg)   SpO2 97%   BMI 29.84 kg/m²   General appearance: in no acute distress, appears stated age  Skin: Skin color, texture, turgor normal. No rashes, +lesions  HEENT: normocephalic, atraumatic  Neck: supple, trachea midline  Lungs:  breathing comfortably  Heart[de-identified] regular rate and rhythm,   Abdomen: non distended  Extremities: extremities normal, atraumatic, no cyanosis or edema  Neurologic: Mental status: alert, oriented, interactive, following commands  Psychiatric: mood and affect appropriate     CBC:   Recent Labs     02/12/23  1629 02/13/23  0316   WBC 6.6 5.2   HGB 10.0* 8.1*    171     BMP:    Recent Labs     02/12/23  1629 02/13/23  0316    142   K 3.6 3.6    107   CO2 18* 23   BUN 26* 24*   CREATININE 2.9* 2.8*   GLUCOSE 171* 98     Hepatic:   Recent Labs     02/12/23  1629   AST 18   ALT 7*   BILITOT 0.4   ALKPHOS 96            Electronically signed by Hilary Jay DO on 2/13/2023 at MD Telma Orellana DO [address],  [address], [place] [address]  PHONE: [phone]  FAX: [phone] No

## 2023-10-02 ENCOUNTER — HOSPITAL ENCOUNTER (OUTPATIENT)
Dept: HOSPITAL 74 - FER | Age: 68
Setting detail: OBSERVATION
LOS: 1 days | Discharge: HOME | End: 2023-10-03
Attending: INTERNAL MEDICINE | Admitting: HOSPITALIST
Payer: COMMERCIAL

## 2023-10-02 VITALS — BODY MASS INDEX: 26.9 KG/M2

## 2023-10-02 DIAGNOSIS — R77.8: Primary | ICD-10-CM

## 2023-10-02 DIAGNOSIS — R00.2: ICD-10-CM

## 2023-10-02 DIAGNOSIS — I47.1: ICD-10-CM

## 2023-10-02 DIAGNOSIS — I10: ICD-10-CM

## 2023-10-02 LAB
ALBUMIN SERPL-MCNC: 4.6 G/DL (ref 3.4–5)
ALP SERPL-CCNC: 82 U/L (ref 45–117)
ALT SERPL-CCNC: 25.7 U/L (ref 7–52)
ANION GAP SERPL CALC-SCNC: 9 MMOL/L (ref 8–16)
APTT BLD: 32.7 SECONDS (ref 25.2–36.5)
AST SERPL-CCNC: 21.9 U/L (ref 15–37)
BILIRUB SERPL-MCNC: 0.5 MG/DL (ref 0.2–1)
BUN SERPL-MCNC: 16.4 MG/DL (ref 7–18)
CALCIUM SERPL-MCNC: 9.5 MG/DL (ref 8.5–10.1)
CHLORIDE SERPL-SCNC: 105 MMOL/L (ref 98–107)
CO2 SERPL-SCNC: 26 MMOL/L (ref 21–32)
CREAT SERPL-MCNC: 1.1 MG/DL (ref 0.6–1.3)
DEPRECATED RDW RBC AUTO: 14.3 % (ref 11.9–15.9)
GLUCOSE SERPL-MCNC: 118 MG/DL (ref 74–106)
HCT VFR BLD CALC: 50.4 % (ref 35.4–49)
HGB BLD-MCNC: 16.8 G/DL (ref 11.7–16.9)
INR BLD: 0.97 (ref 0.83–1.09)
MAGNESIUM SERPL-MCNC: 2.2 MG/DL (ref 1.8–2.4)
MCH RBC QN AUTO: 31.1 PG (ref 25.7–33.7)
MCHC RBC AUTO-ENTMCNC: 33.3 G/DL (ref 32–35.9)
MCV RBC: 93.3 FL (ref 80–96)
PHOSPHATE SERPL-MCNC: 3.47 MG/DL (ref 2.5–4.9)
PLATELET # BLD AUTO: 279.1 10^3/UL (ref 134–434)
PLATELET BLD QL SMEAR: ADEQUATE
PMV BLD: 7.9 FL (ref 7.5–11.1)
POTASSIUM SERPLBLD-SCNC: 4 MMOL/L (ref 3.5–5.1)
PROT SERPL-MCNC: 7.1 G/DL (ref 6.4–8.2)
PT PNL PPP: 11.3 SEC (ref 9.7–13)
RBC # BLD AUTO: 5.4 10^6/UL (ref 4–5.6)
SODIUM SERPL-SCNC: 140 MMOL/L (ref 136–145)
WBC # BLD AUTO: 10.3 10^3/UL (ref 4–10.8)

## 2023-10-02 PROCEDURE — 3E0337Z INTRODUCTION OF ELECTROLYTIC AND WATER BALANCE SUBSTANCE INTO PERIPHERAL VEIN, PERCUTANEOUS APPROACH: ICD-10-PCS | Performed by: INTERNAL MEDICINE

## 2023-10-02 PROCEDURE — G0378 HOSPITAL OBSERVATION PER HR: HCPCS

## 2023-10-02 PROCEDURE — 3E033GC INTRODUCTION OF OTHER THERAPEUTIC SUBSTANCE INTO PERIPHERAL VEIN, PERCUTANEOUS APPROACH: ICD-10-PCS | Performed by: INTERNAL MEDICINE

## 2023-10-03 VITALS
RESPIRATION RATE: 16 BRPM | DIASTOLIC BLOOD PRESSURE: 84 MMHG | TEMPERATURE: 98.6 F | SYSTOLIC BLOOD PRESSURE: 145 MMHG | HEART RATE: 52 BPM

## 2023-10-03 LAB
ANION GAP SERPL CALC-SCNC: 4 MMOL/L (ref 8–16)
BASOPHILS # BLD: 1 % (ref 0–2)
BUN SERPL-MCNC: 14.7 MG/DL (ref 7–18)
CALCIUM SERPL-MCNC: 8 MG/DL (ref 8.5–10.1)
CHLORIDE SERPL-SCNC: 109 MMOL/L (ref 98–107)
CO2 SERPL-SCNC: 27 MMOL/L (ref 21–32)
CREAT SERPL-MCNC: 1 MG/DL (ref 0.55–1.3)
DEPRECATED RDW RBC AUTO: 13.5 % (ref 11.9–15.9)
EOSINOPHIL # BLD: 5.4 % (ref 0–4.5)
GLUCOSE SERPL-MCNC: 89 MG/DL (ref 74–106)
HCT VFR BLD CALC: 41.2 % (ref 35.4–49)
HGB BLD-MCNC: 14.3 GM/DL (ref 11.7–16.9)
LYMPHOCYTES # BLD: 24 % (ref 8–40)
MCH RBC QN AUTO: 31.9 PG (ref 25.7–33.7)
MCHC RBC AUTO-ENTMCNC: 34.8 G/DL (ref 32–35.9)
MCV RBC: 91.6 FL (ref 80–96)
MONOCYTES # BLD AUTO: 7.4 % (ref 3.8–10.2)
NEUTROPHILS # BLD: 62.2 % (ref 42.8–82.8)
PLATELET # BLD AUTO: 235 10^3/UL (ref 134–434)
PMV BLD: 7.6 FL (ref 7.5–11.1)
POTASSIUM SERPLBLD-SCNC: 3.9 MMOL/L (ref 3.5–5.1)
RBC # BLD AUTO: 4.49 M/MM3 (ref 4–5.6)
SODIUM SERPL-SCNC: 140 MMOL/L (ref 136–145)
WBC # BLD AUTO: 7.8 K/MM3 (ref 4–10)